# Patient Record
Sex: MALE | URBAN - METROPOLITAN AREA
[De-identification: names, ages, dates, MRNs, and addresses within clinical notes are randomized per-mention and may not be internally consistent; named-entity substitution may affect disease eponyms.]

---

## 2020-03-02 ENCOUNTER — TELEPHONE (OUTPATIENT)
Dept: ORTHOPEDICS | Facility: CLINIC | Age: 60
End: 2020-03-02

## 2020-03-02 NOTE — TELEPHONE ENCOUNTER
----- Message from Inocencio Christianson sent at 3/2/2020 12:13 PM CST -----  Contact: pt wife Rosa  Type: Needs Medical Advice    Who Called:  Rosa    Renan Call Back Number: [phone]    Phone: [phone] ([place] [facility])  Fax: [phone] ([place] [employer] [facility])    Additional Information: requesting documentation stating the pt only took the blood thinner for the surgery and is no longer taking it. Please call to advise

## 2020-03-02 NOTE — TELEPHONE ENCOUNTER
Called wife--states that pt is trying to get life insurance. Needs letter from Dr. Andrade stating that he was given Lovenox in October 2018 for surgery and is no longer on the medication. Advised wife that Dr. Andrade is in surgery on Monday, will speak to him tomorrow and call wife back. Thanks, Maria Luisa